# Patient Record
Sex: FEMALE | Race: WHITE | Employment: FULL TIME | URBAN - NONMETROPOLITAN AREA
[De-identification: names, ages, dates, MRNs, and addresses within clinical notes are randomized per-mention and may not be internally consistent; named-entity substitution may affect disease eponyms.]

---

## 2018-05-28 ENCOUNTER — HOSPITAL ENCOUNTER (INPATIENT)
Facility: HOSPITAL | Age: 23
LOS: 2 days | Discharge: HOME | End: 2018-05-30
Attending: PSYCHIATRY & NEUROLOGY | Admitting: PSYCHIATRY & NEUROLOGY
Payer: COMMERCIAL

## 2018-05-28 ENCOUNTER — TRANSFERRED RECORDS (OUTPATIENT)
Dept: HEALTH INFORMATION MANAGEMENT | Facility: CLINIC | Age: 23
End: 2018-05-28

## 2018-05-28 DIAGNOSIS — F32.A DEPRESSION WITH SUICIDAL IDEATION: Primary | ICD-10-CM

## 2018-05-28 DIAGNOSIS — R45.851 DEPRESSION WITH SUICIDAL IDEATION: Primary | ICD-10-CM

## 2018-05-28 LAB
ALT SERPL-CCNC: 20 IU/L (ref 6–31)
AST SERPL-CCNC: 17 IU/L (ref 10–40)
CREAT SERPL-MCNC: 0.74 MG/DL (ref 0.4–1)
GLUCOSE SERPL-MCNC: 118 MG/DL (ref 70–100)
POTASSIUM SERPL-SCNC: 3.9 MEQ/L (ref 3.4–5.1)

## 2018-05-28 PROCEDURE — 12400000 ZZH R&B MH

## 2018-05-28 PROCEDURE — 25000132 ZZH RX MED GY IP 250 OP 250 PS 637: Performed by: NURSE PRACTITIONER

## 2018-05-28 RX ORDER — TRAZODONE HYDROCHLORIDE 50 MG/1
50 TABLET, FILM COATED ORAL
Status: DISCONTINUED | OUTPATIENT
Start: 2018-05-28 | End: 2018-05-30 | Stop reason: HOSPADM

## 2018-05-28 RX ORDER — VENLAFAXINE HYDROCHLORIDE 75 MG/1
75 TABLET, EXTENDED RELEASE ORAL
Status: ON HOLD | COMMUNITY
End: 2018-05-30

## 2018-05-28 RX ORDER — HYDROXYZINE HYDROCHLORIDE 25 MG/1
25-50 TABLET, FILM COATED ORAL EVERY 4 HOURS PRN
Status: DISCONTINUED | OUTPATIENT
Start: 2018-05-28 | End: 2018-05-30 | Stop reason: HOSPADM

## 2018-05-28 RX ORDER — ALUMINA, MAGNESIA, AND SIMETHICONE 2400; 2400; 240 MG/30ML; MG/30ML; MG/30ML
30 SUSPENSION ORAL EVERY 4 HOURS PRN
Status: DISCONTINUED | OUTPATIENT
Start: 2018-05-28 | End: 2018-05-30 | Stop reason: HOSPADM

## 2018-05-28 RX ORDER — ACETAMINOPHEN 325 MG/1
650 TABLET ORAL EVERY 4 HOURS PRN
Status: DISCONTINUED | OUTPATIENT
Start: 2018-05-28 | End: 2018-05-30 | Stop reason: HOSPADM

## 2018-05-28 RX ORDER — OLANZAPINE 10 MG/2ML
10 INJECTION, POWDER, FOR SOLUTION INTRAMUSCULAR 3 TIMES DAILY PRN
Status: DISCONTINUED | OUTPATIENT
Start: 2018-05-28 | End: 2018-05-30 | Stop reason: HOSPADM

## 2018-05-28 RX ORDER — OLANZAPINE 10 MG/1
10 TABLET ORAL 3 TIMES DAILY PRN
Status: DISCONTINUED | OUTPATIENT
Start: 2018-05-28 | End: 2018-05-30 | Stop reason: HOSPADM

## 2018-05-28 RX ADMIN — ALUMINUM HYDROXIDE, MAGNESIUM HYDROXIDE, AND DIMETHICONE 30 ML: 400; 400; 40 SUSPENSION ORAL at 19:00

## 2018-05-28 RX ADMIN — ACETAMINOPHEN 650 MG: 325 TABLET ORAL at 19:00

## 2018-05-28 ASSESSMENT — ACTIVITIES OF DAILY LIVING (ADL)
BATHING: 0-->INDEPENDENT
COGNITION: 0 - NO COGNITION ISSUES REPORTED
TOILETING: 0-->INDEPENDENT
DRESS: 0-->INDEPENDENT
TRANSFERRING: 0-->INDEPENDENT
RETIRED_COMMUNICATION: 0-->UNDERSTANDS/COMMUNICATES WITHOUT DIFFICULTY
RETIRED_EATING: 0-->INDEPENDENT
FALL_HISTORY_WITHIN_LAST_SIX_MONTHS: NO
SWALLOWING: 0-->SWALLOWS FOODS/LIQUIDS WITHOUT DIFFICULTY
AMBULATION: 0-->INDEPENDENT

## 2018-05-28 NOTE — IP AVS SNAPSHOT
MRN:[id number]                      After Visit Summary   5/28/2018    Kriss Eason    MRN: [id number]           Thank you!     Thank you for choosing [facility] for your care. Our goal is always to provide you with excellent care. Hearing back from our patients is one way we can continue to improve our services. Please take a few minutes to complete the written survey that you may receive in the mail after you visit with us. Thank you!        Patient Information     Date Of Birth          1995        Designated Caregiver       Most Recent Value    Caregiver    Will someone help with your care after discharge? no      About your hospital stay     You were admitted on:  May 28, 2018 You last received care in the:  [facility]    You were discharged on:  May 30, 2018       Who to Call     For medical emergencies, please call 911.  For non-urgent questions about your medical care, please call your primary care provider or clinic, [phone]          Attending Provider     Provider Specialty    Huey Lazo MD Psychiatry    Mayte Luciano NP Nurse Practitioner       Primary Care Provider Office Phone # Fax #    Bartolome Freeman [phone]      Further instructions from your care team       Behavioral Discharge Planning and Instructions    Summary: Kriss was admitted to  due to SI with a plan    Main Diagnosis: mdd recurrent moderatre, r/optsd , alcohol use disorder, moderate    Major Treatments, Procedures and Findings: Stabilize with medications, connect with community programs.    Symptoms to Report: feeling more aggressive, increased confusion, losing more sleep, mood getting worse or thoughts of suicide    Lifestyle Adjustment: Take all medications as prescribed, meet with doctor/ medication provider, out patient therapist, , and [facility] worker as scheduled. Abstain from alcohol or any unprescribed drugs.    Psychiatry Follow-up:      [facility]  Medication Manager- will schedule diagnostic on 6/5 apt.   Therapy- 6/5 @[address] [place] [address] [address]  [place], [place]  [phone]  Fax [phone]    Resources:   Crisis Intervention: 216.746.2087 or 584-946-4311 (TTY: 292.712.2916).  Call anytime for help.  National [place] on Mental Illness (www.mn.shannen.org): 349.195.1522 or 283-534-0283.  Alcoholics Anonymous (www.alcoholics-anonymous.org): Check your phone book for your local chapter.  Suicide Awareness Voices of Education (SAVE) (www.save.org): 032-874-QCDU (8481)  National Suicide Prevention Line (www.mentalhealthmn.org): 158-607-ZXFV (1947)  Mental Health Consumer/Survivor Network of [place] (www.mhcsn.net): 372.273.3295 or 786-240-4789    General Medication Instructions:   See your medication sheet(s) for instructions.   Take all medicines as directed.  Make no changes unless your doctor suggests them.   Go to all your doctor visits.  Be sure to have all your required lab tests. This way, your medicines can be refilled on time.  Do not use any drugs not prescribed by your doctor.  Avoid alcohol.    [place] Area:  [facility], Crisis stabilization [facility]- [phone]  [facility] Crisis Line: 1-543.972.5901  [facility]: 881-3032  Sexual Assault Program [facility]: 604.870.3615 or 1-279.891.2608  [place] [facility]: 0-162-715-6347 Ext: 279       Calls answered Mon-Fri-8:00 am--4:30 pm    [place]:  [facility]: 1-802.749.3253  [facility] first call for help: 1-936.470.1201  [facility]:  (130) 274-2685      [place] Area:  Warm Line: 1-756.838.5783       Calls answered Tuesday--Saturday 4:00 pm--10:00 pm  [facility] [place] Crisis Line - [phone]  [facility] 336-618-6943    [place]wide:  [place] Crisis and Referral Services: 1-792.135.5693  National Suicide Prevention Lifeline: 9-624-679-GAIZ (5262)   - las4eyvs- Text  Life  to  "[address]  First Call for Help: -  [facility] Helpline- 8-306-FURO-HELP      Pending Results     No orders found from 2018 to 2018.            Statement of Approval     Ordered          18 1208  I have reviewed and agree with all the recommendations and orders detailed in this document.  EFFECTIVE NOW     Approved and electronically signed by:  Ngoc Benito Mc, NORA CNP             Admission Information     Date & Time Provider Department Dept. Phone    2018 Mayte Luciano NP [facility] [phone]      Your Vitals Were     Blood Pressure Pulse Temperature Respirations Height Weight    128/76 89 98.3  F (36.8  C) (Tympanic) 14 1.549 m (5' 1\") 77 kg (169 lb 11.2 oz)    Pulse Oximetry BMI (Body Mass Index)                98% 32.06 kg/m2          Ar[employer]ule[employer] Information     Plug Apps lets you send messages to your doctor, view your test results, renew your prescriptions, schedule appointments and more. To sign up, go to [email or web address] . Click on \"Log in\" on the left side of the screen, which will take you to the Welcome page. Then click on \"Sign up Now\" on the right side of the page.     You will be asked to enter the access code listed below, as well as some personal information. Please follow the directions to create your username and password.     Your access code is: [email or web address]  Expires: 2018 12:55 PM     Your access code will  in 90 days. If you need help or a new code, please call your [place] clinic or [phone].        Care EveryWhere ID     This is your Care EveryWhere ID. This could be used by other organizations to access your [place] medical records  [id number]        Equal Access to Services     ART APONTE : Negra Vargas, miguel erazo, faye clark, alvarez pro. So [facility] [phone].    ATENCIÓN: Si habla español, tiene a fields disposición servicios gratuitos de asistencia lingüística. Llame " al [phone].    We comply with applicable federal civil rights laws and [place] laws. We do not discriminate on the basis of race, color, national origin, age, disability, sex, sexual orientation, or gender identity.               Review of your medicines      START taking        Dose / Directions    naltrexone 50 MG tablet   Commonly known as:  DEPADE;REVIA        Dose:  50 mg   Start taking on:  5/31/2018   Take 1 tablet (50 mg) by mouth daily   Quantity:  30 tablet   Refills:  0       venlafaxine 37.5 MG 24 hr capsule   Commonly known as:  EFFEXOR-XR   Replaces:  venlafaxine 75 MG Tb24 24 hr tablet        Dose:  112.5 mg   Start taking on:  5/31/2018   Take 3 capsules (112.5 mg) by mouth daily (with breakfast)   Quantity:  90 capsule   Refills:  0         STOP taking     venlafaxine 75 MG Tb24 24 hr tablet   Commonly known as:  EFFEXOR-ER   Replaced by:  venlafaxine 37.5 MG 24 hr capsule                Where to get your medicines      These medications were sent to [facility] [address] [place] [address] [address]   [address] , [facility] [address]     Phone:  [phone]     naltrexone 50 MG tablet    venlafaxine 37.5 MG 24 hr capsule                Protect others around you: Learn how to safely use, store and throw away your medicines at www.disposemymeds.org.             Medication List: This is a list of all your medications and when to take them. Check marks below indicate your daily home schedule. Keep this list as a reference.      Medications           Morning Afternoon Evening Bedtime As Needed    naltrexone 50 MG tablet   Commonly known as:  DEPADE;REVIA   Take 1 tablet (50 mg) by mouth daily   Start taking on:  5/31/2018   Last time this was given:  50 mg on 5/30/2018 11:56 AM                                venlafaxine 37.5 MG 24 hr capsule   Commonly known as:  EFFEXOR-XR   Take 3 capsules (112.5 mg) by mouth daily (with breakfast)   Start taking on:  5/31/2018   Last time  this was given:  112.5 mg on 5/30/2018  8:41 AM

## 2018-05-28 NOTE — IP AVS SNAPSHOT
[facility]    [address] [place] [address]    Phone:  [phone]    Fax:  [phone]                                       After Visit Summary   5/28/2018    Kriss Eason    MRN: [id number]           After Visit Summary Signature Page     I have received my discharge instructions, and my questions have been answered. I have discussed any challenges I see with this plan with the nurse or doctor.    ..........................................................................................................................................  Patient/Patient Representative Signature      ..........................................................................................................................................  Patient Representative Print Name and Relationship to Patient    ..................................................               ................................................  Date                                            Time    ..........................................................................................................................................  Reviewed by Signature/Title    ...................................................              ..............................................  Date                                                            Time

## 2018-05-28 NOTE — PROVIDER NOTIFICATION
05/28/18 5385   Patient Belongings   Did you bring any home meds/supplements to the hospital?  Yes   Disposition of meds  Sent to security/pharmacy per site process   Patient Belongings body jewelry;cell phone/electronics;clothing;earings;glasses;keys;money (see comment);plastic bag;purse;shoes;wallet;other (see comments)   Disposition of Belongings Sent to security per site process;Other (see comment);Kept with patient  (pt cubby in belongings room)   Belongings Search Yes   Clothing Search Yes   Second Staff OKSANA Ulloa   General Info Comment black and white checked shoes, black strappy bra, Pink brand black leggings, pink brand teal sweatshirt, black and camo wallet, cell phone  adapter, 2 drink chips, misc cards, brown fringe purse, target receipt, baby lotion, 2 blue poker chips, lens cleaning towlette, black lighter, chapstick, 2 guru pins, 4 tampons. Kept with pt- 1 nose ring, 2 earrings, 1 tongue ring, black eye glasses    List items sent to safe: iphone with dark purple case (no cracks), iphone ,  Xtalic lanyard with 2 keys, [facility] PIN number, fortune bay card, medica card, bluecross blueshield card, 2 delta dental cards, mastercard, 2 VISA debit cards, [employer] Pink Bhavik credit card, [employer] [place] [employer] ID, social security card, [place] 's license, $6.89 cash (1-$5 bill, 1-$1 bill, 2 quarters, 2 dimes, 2 nickels, 9 pennies)  All other belongings put in assigned cubby in belongings room.     I have reviewed my belongings list on admission and verify that it is correct.     Patient signature_______________________________    Second staff witness (if patient unable to sign) ______________________________       I have received all my belongings at discharge.    Patient signature________________________________    Rae GARY RN  5/28/2018  6:06 PM

## 2018-05-29 PROCEDURE — 25000132 ZZH RX MED GY IP 250 OP 250 PS 637: Performed by: NURSE PRACTITIONER

## 2018-05-29 PROCEDURE — 12400000 ZZH R&B MH

## 2018-05-29 PROCEDURE — 99223 1ST HOSP IP/OBS HIGH 75: CPT | Performed by: NURSE PRACTITIONER

## 2018-05-29 RX ORDER — NALTREXONE HYDROCHLORIDE 50 MG/1
50 TABLET, FILM COATED ORAL DAILY
Status: DISCONTINUED | OUTPATIENT
Start: 2018-05-29 | End: 2018-05-30 | Stop reason: HOSPADM

## 2018-05-29 RX ADMIN — VENLAFAXINE HYDROCHLORIDE 112.5 MG: 75 CAPSULE, EXTENDED RELEASE ORAL at 13:27

## 2018-05-29 RX ADMIN — ACETAMINOPHEN 650 MG: 325 TABLET ORAL at 16:28

## 2018-05-29 RX ADMIN — NALTREXONE HYDROCHLORIDE 50 MG: 50 TABLET, FILM COATED ORAL at 13:27

## 2018-05-29 ASSESSMENT — ACTIVITIES OF DAILY LIVING (ADL)
ORAL_HYGIENE: INDEPENDENT
GROOMING: INDEPENDENT
GROOMING: INDEPENDENT
ORAL_HYGIENE: INDEPENDENT
DRESS: INDEPENDENT;SCRUBS (BEHAVIORAL HEALTH)
DRESS: SCRUBS (BEHAVIORAL HEALTH);INDEPENDENT
LAUNDRY: UNABLE TO COMPLETE

## 2018-05-29 NOTE — PLAN OF CARE
Problem: Patient Care Overview  Goal: Individualization & Mutuality  Patient will sleep 6-8 hours per night.  Patient will attend >50% of groups.   Patient will agreed to treatment team recommendations for medication and after care.    Pt denies SI, HI, and hallucinations. Says she only feels suicidal when she's been drinking. She has isolated to room, did come to open unit for a phone call. Says she is tired, hasn't been sleeping well at home due to nightmares. Encouraged pt to come out to open unit but she declines. Affect is blunted and flat. Pt is teary eyed at times. She endorses in anxiety and depression but declines intervention. No complaints of pain. Will continue to monitor.   1430: Pt has been weaning out to open unit this afternoon, sitting in the lounge but is withdrawn.     Problem: Suicide Risk (Adult)  Goal: Strength-Based Wellness/Recovery  Patient will report that anxiety and depression are managable by discharge.  Patient will verbalize 2 new coping skills while inpatient.    Pt endorses in anxiety and depression
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
## 2018-05-29 NOTE — PLAN OF CARE
Face to face end of shift report received from GWENDOLYN Gates. Rounding completed. Patient observed in bed.     Kathryn Cuevas  5/29/2018  7:39 AM

## 2018-05-29 NOTE — DISCHARGE INSTRUCTIONS
Behavioral Discharge Planning and Instructions    Summary: Kriss was admitted to  due to SI with a plan    Main Diagnosis: mdd recurrent moderatre, r/optsd , alcohol use disorder, moderate    Major Treatments, Procedures and Findings: Stabilize with medications, connect with community programs.    Symptoms to Report: feeling more aggressive, increased confusion, losing more sleep, mood getting worse or thoughts of suicide    Lifestyle Adjustment: Take all medications as prescribed, meet with doctor/ medication provider, out patient therapist, , and [facility]HS worker as scheduled. Abstain from alcohol or any unprescribed drugs.    Psychiatry Follow-up:     [facility]  Medication Manager- will schedule diagnostic on 6/5 apt.   Therapy- 6/5 @[address] [place] [address] S. [address], [id number]  [phone]  Fax [phone]    Resources:   Crisis Intervention: 899.840.2430 or 249-296-5335 (TTY: 450.825.3250).  Call anytime for help.  National [place] on Mental Illness (www.mn.shannen.org): 412.737.4094 or 138-789-0697.  Alcoholics Anonymous (www.alcoholics-anonymous.org): Check your phone book for your local chapter.  Suicide Awareness Voices of Education (SAVE) (www.save.org): 857-480-DJWB (8069)  National Suicide Prevention Line (www.mentalhealthmn.org): 380-803-BILP (4493)  Mental Health Consumer/Survivor Network of [place] (www.mhcsn.net): 938.947.6766 or 086-210-3817    General Medication Instructions:   See your medication sheet(s) for instructions.   Take all medicines as directed.  Make no changes unless your doctor suggests them.   Go to all your doctor visits.  Be sure to have all your required lab tests. This way, your medicines can be refilled on time.  Do not use any drugs not prescribed by your doctor.  Avoid alcohol.    [place] Area:  [facility], Crisis stabilization [facility]- [phone]  [facility] Crisis Line: 1-926.491.9900  [facility] Family [facility]: 217-3319  Sexual Assault Program of  [facility]: 711-480-1320 or 1-153.847.7229  [place] [facility] Women[facility]: 9-407-722-5740 Ext: 279       Calls answered Mon-Fri-8:00 am--4:30 pm    [place]:  [facility] Family [facility]: 3-618-573-1783  [facility] first call for help: 8-138-291-0412  [facility] Crisis Center:  (446) 426-1208      [place] Area:  Warm Line: 1-158.205.2153       Calls answered Tuesday--Saturday 4:00 pm--10:00 pm  [place] Crisis Line - [phone]  [facility] 591-872-4860    [place]wide:  [place] Crisis and Referral Services: 1-135.622.1670  National Suicide Prevention Lifeline: 7-492-111-TALK (4544)   - tss7wdoj- Text  Life  to 999[address]  First Call for Help: 2-1-1  [facility] Helpline- 4-602-PLSU-HELP
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
## 2018-05-29 NOTE — PLAN OF CARE
"Problem: Patient Care Overview  Goal: Individualization & Mutuality  Patient will sleep 6-8 hours per night.  Patient will attend >50% of groups.   Patient will agreed to treatment team recommendations for medication and after care.    Outcome: No Change  Patient has been isolative and withdrawn. Patient c/o a pinched nerve in her neck causing her a sharp pain and headache. She was given 650 mg of PRN Tylenol at 1628. Patient reported decrease in pain upon re-assessment. Patient had a depressed mood. She denied suicidal ideation - she stated \"I just need to stop drinking and be consistent with taking my Venlafaxine.\" She did spent some time in the lounge tonight watching TV and putting together a puzzle.     Problem: Suicide Risk (Adult)  Goal: Strength-Based Wellness/Recovery  Patient will report that anxiety and depression are managable by discharge.  Patient will verbalize 2 new coping skills while inpatient.    Outcome: No Change  Patient continues to endorse anxiety and depression. She has been isolative and withdrawn. She did not verbalize any new coping skills tonight.       "

## 2018-05-29 NOTE — PLAN OF CARE
Problem: Patient Care Overview  Goal: Individualization & Mutuality  Patient will sleep 6-8 hours per night.  Patient will attend >50% of groups.   Patient will agreed to treatment team recommendations for medication and after care.    Outcome: Improving  Pt has been in bed with eyes closed and regular respirations observed all night. Will continue to monitor.

## 2018-05-29 NOTE — PLAN OF CARE
Problem: Patient Care Overview  Goal: Team Discussion  Team Plan:   BEHAVIORAL TEAM DISCUSSION    Participants: Marilou Borrego NP, Emelina Lehman NP,  Mayte Luciano NP,  Anny Hicks [facility], Bruna Houser [facility], Laverne Long [facility], Vickie Hess RN,  Bryan Muñoz RN,  . Ter[facility], Emelina Buck OT  Progress: new admit  Continued Stay Criteria/Rationale: Assess and start meds  Medical/Physical: none  Precautions:   Behavioral Orders   Procedures     Code 1 - Restrict to Unit     Routine Programming     As clinically indicated     Self Injury Precaution     Bathroom door to remain locked until after provider evaluation     Status 15     Every 15 minutes.     Plan: assess and set up with services  Rationale for change in precautions or plan: n/a

## 2018-05-29 NOTE — PLAN OF CARE
Face to face end of shift report received from Kathryn HERNANDEZ RN. Rounding completed. Patient observed resting in bed.     Lashay Lopez  5/29/2018  4:00 PM

## 2018-05-29 NOTE — PLAN OF CARE
"Social Service Psychosocial Assessment  Presenting Problem:   Kriss is a 22 year old, female who presented to [facility] ED. Pt states that she got really drunk and stressed herself out. Pt states that she blacked out but has been feeling inadequate with herself. Pt had SI with a plan to jump off a local bridge into a mine pit.   Marital Status:      Spouse / Children:    none  Psychiatric TX HX:   Pt states she has never been on an inpatient unit  Suicide Risk Assessment:  On admission pt endorsed SI. Pt states she has never had an attempt of had SI in the past. Pt denies any SI today  Access to Lethal Means (explain):   Denies any access to weapons   Family Psych HX:   Pt states her mother is severely depressed and has manic bipolar   A & Ox:   3  Medication Adherence:   Unknown   Medical Issues:   See H&P  Visual -Motor Functioning:   good  Communication Skills /Needs:   good  Ethnicity:   White     Spirituality/Evangelical Affiliation:   none   Clergy Request:   No   History:   Was in the [employer] for a couple years   Living Situation:   Pt currently lives in [place] with karolina, states she has a good living environment   ADL s:  Independent   Education:  GED, Some college   Financial Situation:   Pt states her finances are fine, no problem   Occupation:  CNA at [employer], assisted living   Leisure & Recreation:  Pt states she enjoys watching movies and tv   Childhood History:   Pt states she lived in [place], [place] until moving here at the age of 7. Pt states she had a so/so childhood.   Trauma Abuse HX:   Pt states mom wasn't there to take care of her and her brother due to being \"out doing drugs\"   Relationship / Sexuality:   Engaged, has been with karolina for 1 1/2 years  Substance Use/ Abuse:   Drink a lot when she does drink, pt states she drinking every couple of weeks   Chemical Dependency Treatment HX:   None   Legal Issues:   Recently got a DUI in April, she is on " probation for two years   Significant Life Events: DUI  Strengths:   Willing to accept services, has a good home  Challenges /Limitation:   Alcohol use and MH   Patient Support Contact (Include name, relationship, number, and summary of conversation):   Pt states she does not want me talking to anyone   Interventions:        Medication Management- will set up     Individual Therapy- [facility]    Suicide Risk Assessment-  On admission pt endorsed SI. Pt states she has never had an attempt of had SI in the past. Pt denies any SI today    High Risk Safety Plan- Talk to supports; Call crisis lines; Go to local ER if feeling suicidal.  Laverne Long  5/29/2018  2:21 PM

## 2018-05-30 VITALS
RESPIRATION RATE: 14 BRPM | HEART RATE: 89 BPM | TEMPERATURE: 98.3 F | DIASTOLIC BLOOD PRESSURE: 76 MMHG | HEIGHT: 61 IN | WEIGHT: 169.7 LBS | SYSTOLIC BLOOD PRESSURE: 128 MMHG | BODY MASS INDEX: 32.04 KG/M2 | OXYGEN SATURATION: 98 %

## 2018-05-30 PROCEDURE — 99239 HOSP IP/OBS DSCHRG MGMT >30: CPT | Performed by: NURSE PRACTITIONER

## 2018-05-30 PROCEDURE — 25000132 ZZH RX MED GY IP 250 OP 250 PS 637: Performed by: NURSE PRACTITIONER

## 2018-05-30 RX ORDER — NALTREXONE HYDROCHLORIDE 50 MG/1
50 TABLET, FILM COATED ORAL DAILY
Qty: 30 TABLET | Refills: 0 | Status: SHIPPED | OUTPATIENT
Start: 2018-05-31

## 2018-05-30 RX ORDER — VENLAFAXINE HYDROCHLORIDE 37.5 MG/1
112.5 CAPSULE, EXTENDED RELEASE ORAL
Qty: 90 CAPSULE | Refills: 0 | Status: SHIPPED | OUTPATIENT
Start: 2018-05-31 | End: 2018-06-01

## 2018-05-30 RX ADMIN — NALTREXONE HYDROCHLORIDE 50 MG: 50 TABLET, FILM COATED ORAL at 11:56

## 2018-05-30 RX ADMIN — VENLAFAXINE HYDROCHLORIDE 112.5 MG: 75 CAPSULE, EXTENDED RELEASE ORAL at 08:41

## 2018-05-30 NOTE — PLAN OF CARE
Problem: Patient Care Overview  Goal: Individualization & Mutuality  Patient will sleep 6-8 hours per night.  Patient will attend >50% of groups.   Patient will tend to her hygiene/grooming.   Patient will agreed to treatment team recommendations for medication and after care.    5-30-18 - Patient able to wean. In MH-ICU due to lack of room availability on the open unit.    Outcome: Improving  Patient showered this morning. Weaned to general unit. Playing cards and socializing with a peers. States her mood has improved rating it an 8/10 today. Is hoping to discharge within the next couple days. States her SI has resolved.  Denies issues with sleep or appetite. Eating well. Denies pain.       New order to discharge patient home. Patient's fiance is going to pick her up before 1500.     Problem: Suicide Risk (Adult)  Goal: Strength-Based Wellness/Recovery  Patient will report that anxiety and depression are managable by discharge.  Patient will verbalize 2 new coping skills while inpatient.    Outcome: Improving  Patient rates her mood an 8/10. Denies SI.
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
## 2018-05-30 NOTE — PLAN OF CARE
Face to face end of shift report received from Lashay OROZCO RN. Rounding completed. Patient observed resting in bed.     Rolando Eastman  5/30/2018  12:10 AM

## 2018-05-30 NOTE — PLAN OF CARE
Problem: Patient Care Overview  Goal: Team Discussion  Team Plan:   BEHAVIORAL TEAM DISCUSSION    Participants: Sho Jay NP,Parveen Emerson NP, Ngoc Benito NP, Bruna Houser LGSW, Laverne MCELROYW, Emelina Buck OT  Progress: Same, hanging out in lobby, not going to groups   Continued Stay Criteria/Rationale: Started Effexor   Medical/Physical: na  Precautions:   Behavioral Orders   Procedures     Code 1 - Restrict to Unit     Routine Programming     As clinically indicated     Self Injury Precaution     Bathroom door to remain locked until after provider evaluation     Status 15     Every 15 minutes.     Plan: d/c home with services   Rationale for change in precautions or plan: none

## 2018-05-30 NOTE — PLAN OF CARE
Pt is discharging at the recommendation of the treatment team. [facility] Pt denies having any thoughts of hurting themself or anyone else. Pt denies anxiety or depression. Pt has follow up with [facility] for Therapy and Medication Management. Discharge instructions, including; demographic sheet, psychiatric evaluation, discharge summary, and AVS were faxed to these next level of care providers.

## 2018-05-30 NOTE — PLAN OF CARE
Discharge Note    Patient Discharged to home on 5/30/2018 1:23 PM via Private Car accompanied by karolina.     Patient informed of discharge instructions in AVS. Patient verbalizes understanding and denies having any questions pertaining to AVS. Patient stable at time of discharge. Patient denies SI, HI, and thoughts of self harm at time of discharge. All personal belongings returned to patient. Discharge prescriptions sent to [facility] via electronic communication. Social work to fax patient information to next level of care.     Yaima Saavedra  5/30/2018  1:23 PM

## 2018-05-30 NOTE — PLAN OF CARE
Face to face end of shift report received from GWENDOLYN Marshall. Rounding completed. Patient observed resting in bed. Respirations equal and non-labored.      Yaima Saavedra  5/30/2018  [id number]

## 2018-05-30 NOTE — PLAN OF CARE
Problem: Patient Care Overview  Goal: Individualization & Mutuality  Patient will sleep 6-8 hours per night.  Patient will attend >50% of groups.   Patient will agreed to treatment team recommendations for medication and after care.    5-29-18 - Patient able to wean. In -ICU due to lack of room availability on the open unit.    Outcome: No Change  Pt has been in bed with eyes closed and regular respirations observed all night. Will continue to monitor.

## 2018-06-01 DIAGNOSIS — R45.851 DEPRESSION WITH SUICIDAL IDEATION: ICD-10-CM

## 2018-06-01 DIAGNOSIS — F32.A DEPRESSION WITH SUICIDAL IDEATION: ICD-10-CM

## 2018-06-01 RX ORDER — VENLAFAXINE HYDROCHLORIDE 150 MG/1
150 CAPSULE, EXTENDED RELEASE ORAL DAILY
Qty: 90 CAPSULE | Refills: 1 | Status: SHIPPED | OUTPATIENT
Start: 2018-06-01 | End: 2018-06-01

## 2018-06-01 RX ORDER — VENLAFAXINE HYDROCHLORIDE 150 MG/1
150 CAPSULE, EXTENDED RELEASE ORAL DAILY
Qty: 30 CAPSULE | Refills: 1 | Status: SHIPPED | OUTPATIENT
Start: 2018-06-01

## 2018-06-01 NOTE — PROGRESS NOTES
Insurance will only cover one pill per day of XR. Increasing from 112.5 to 150 mg once a day. NORA Nicole CNP